# Patient Record
Sex: FEMALE | Race: ASIAN
[De-identification: names, ages, dates, MRNs, and addresses within clinical notes are randomized per-mention and may not be internally consistent; named-entity substitution may affect disease eponyms.]

---

## 2018-01-07 ENCOUNTER — TRANSCRIPTION ENCOUNTER (OUTPATIENT)
Age: 33
End: 2018-01-07

## 2018-01-16 ENCOUNTER — APPOINTMENT (OUTPATIENT)
Dept: INTERNAL MEDICINE | Facility: CLINIC | Age: 33
End: 2018-01-16
Payer: COMMERCIAL

## 2018-01-16 VITALS
HEIGHT: 62 IN | DIASTOLIC BLOOD PRESSURE: 70 MMHG | OXYGEN SATURATION: 98 % | SYSTOLIC BLOOD PRESSURE: 104 MMHG | BODY MASS INDEX: 33.68 KG/M2 | TEMPERATURE: 98.3 F | HEART RATE: 82 BPM | WEIGHT: 183 LBS

## 2018-01-16 DIAGNOSIS — Z84.0 FAMILY HISTORY OF DISEASES OF THE SKIN AND SUBCUTANEOUS TISSUE: ICD-10-CM

## 2018-01-16 DIAGNOSIS — Z78.9 OTHER SPECIFIED HEALTH STATUS: ICD-10-CM

## 2018-01-16 DIAGNOSIS — Z87.440 PERSONAL HISTORY OF URINARY (TRACT) INFECTIONS: ICD-10-CM

## 2018-01-16 DIAGNOSIS — Z83.49 FAMILY HISTORY OF OTHER ENDOCRINE, NUTRITIONAL AND METABOLIC DISEASES: ICD-10-CM

## 2018-01-16 DIAGNOSIS — Z82.0 FAMILY HISTORY OF EPILEPSY AND OTHER DISEASES OF THE NERVOUS SYSTEM: ICD-10-CM

## 2018-01-16 PROCEDURE — 36415 COLL VENOUS BLD VENIPUNCTURE: CPT

## 2018-01-16 PROCEDURE — 99385 PREV VISIT NEW AGE 18-39: CPT | Mod: 25

## 2018-01-16 RX ORDER — ELASTIC BANDAGE 2"X2.2YD
BANDAGE TOPICAL
Refills: 0 | Status: ACTIVE | COMMUNITY

## 2018-01-16 RX ORDER — VITAMIN B COMPLEX
CAPSULE ORAL
Refills: 0 | Status: ACTIVE | COMMUNITY

## 2018-01-17 LAB
25(OH)D3 SERPL-MCNC: 21.8 NG/ML
ALBUMIN SERPL ELPH-MCNC: 4.2 G/DL
ALP BLD-CCNC: 61 U/L
ALT SERPL-CCNC: 21 U/L
ANION GAP SERPL CALC-SCNC: 13 MMOL/L
AST SERPL-CCNC: 27 U/L
BASOPHILS # BLD AUTO: 0.03 K/UL
BASOPHILS NFR BLD AUTO: 0.5 %
BILIRUB SERPL-MCNC: 0.3 MG/DL
BUN SERPL-MCNC: 12 MG/DL
CALCIUM SERPL-MCNC: 9.5 MG/DL
CHLORIDE SERPL-SCNC: 103 MMOL/L
CHOLEST SERPL-MCNC: 177 MG/DL
CHOLEST/HDLC SERPL: 3.3 RATIO
CO2 SERPL-SCNC: 25 MMOL/L
CREAT SERPL-MCNC: 0.77 MG/DL
EOSINOPHIL # BLD AUTO: 0.05 K/UL
EOSINOPHIL NFR BLD AUTO: 0.9 %
GLUCOSE SERPL-MCNC: 99 MG/DL
HBA1C MFR BLD HPLC: 5.5 %
HCT VFR BLD CALC: 44.6 %
HDLC SERPL-MCNC: 53 MG/DL
HGB BLD-MCNC: 14.5 G/DL
HIV1+2 AB SPEC QL IA.RAPID: NONREACTIVE
IMM GRANULOCYTES NFR BLD AUTO: 0.2 %
LDLC SERPL CALC-MCNC: 108 MG/DL
LYMPHOCYTES # BLD AUTO: 1.89 K/UL
LYMPHOCYTES NFR BLD AUTO: 32.4 %
MAN DIFF?: NORMAL
MCHC RBC-ENTMCNC: 30.9 PG
MCHC RBC-ENTMCNC: 32.5 GM/DL
MCV RBC AUTO: 95.1 FL
MONOCYTES # BLD AUTO: 0.34 K/UL
MONOCYTES NFR BLD AUTO: 5.8 %
NEUTROPHILS # BLD AUTO: 3.51 K/UL
NEUTROPHILS NFR BLD AUTO: 60.2 %
PLATELET # BLD AUTO: 251 K/UL
POTASSIUM SERPL-SCNC: 4.4 MMOL/L
PROT SERPL-MCNC: 7.5 G/DL
RBC # BLD: 4.69 M/UL
RBC # FLD: 13.5 %
SODIUM SERPL-SCNC: 141 MMOL/L
TRIGL SERPL-MCNC: 81 MG/DL
TSH SERPL-ACNC: 1.36 UIU/ML
WBC # FLD AUTO: 5.83 K/UL

## 2018-01-18 LAB
ADJUSTED MITOGEN: 9.58 IU/ML
ADJUSTED TB AG: 0 IU/ML
M TB IFN-G BLD-IMP: NEGATIVE
QUANTIFERON GOLD NIL: 0.04 IU/ML

## 2018-02-12 ENCOUNTER — APPOINTMENT (OUTPATIENT)
Dept: OBGYN | Facility: CLINIC | Age: 33
End: 2018-02-12
Payer: COMMERCIAL

## 2018-02-12 ENCOUNTER — RESULT REVIEW (OUTPATIENT)
Age: 33
End: 2018-02-12

## 2018-02-12 PROCEDURE — 99385 PREV VISIT NEW AGE 18-39: CPT

## 2018-03-01 ENCOUNTER — TRANSCRIPTION ENCOUNTER (OUTPATIENT)
Age: 33
End: 2018-03-01

## 2018-07-28 PROBLEM — Z78.9 ALCOHOL USE: Status: ACTIVE | Noted: 2018-01-16

## 2018-08-10 ENCOUNTER — APPOINTMENT (OUTPATIENT)
Dept: FAMILY MEDICINE | Facility: CLINIC | Age: 33
End: 2018-08-10

## 2018-08-13 ENCOUNTER — APPOINTMENT (OUTPATIENT)
Dept: INTERNAL MEDICINE | Facility: CLINIC | Age: 33
End: 2018-08-13

## 2018-08-21 ENCOUNTER — APPOINTMENT (OUTPATIENT)
Dept: INTERNAL MEDICINE | Facility: CLINIC | Age: 33
End: 2018-08-21
Payer: COMMERCIAL

## 2018-08-21 VITALS
SYSTOLIC BLOOD PRESSURE: 114 MMHG | BODY MASS INDEX: 35.51 KG/M2 | DIASTOLIC BLOOD PRESSURE: 84 MMHG | WEIGHT: 193 LBS | HEIGHT: 62 IN | HEART RATE: 90 BPM | OXYGEN SATURATION: 98 %

## 2018-08-21 DIAGNOSIS — H91.90 UNSPECIFIED HEARING LOSS, UNSPECIFIED EAR: ICD-10-CM

## 2018-08-21 PROCEDURE — 99213 OFFICE O/P EST LOW 20 MIN: CPT

## 2018-08-21 RX ORDER — CLOBETASOL PROPIONATE 0.5 MG/G
0.05 CREAM TOPICAL TWICE DAILY
Qty: 15 | Refills: 0 | Status: ACTIVE | COMMUNITY
Start: 2018-08-21 | End: 1900-01-01

## 2018-08-21 NOTE — ASSESSMENT
[FreeTextEntry1] : 33F with psoriasis comes in for follow up\par - psoriasis: clobetasol cream provided\par - concern about bacterial vaginosis: specimen sent\par \par RTC in Jan 2019 for annual check up

## 2018-08-21 NOTE — HISTORY OF PRESENT ILLNESS
[FreeTextEntry1] : follow up [de-identified] : 33F with psoriasis comes in for follow up\par does not exercise\par trying to have a baby

## 2018-08-21 NOTE — REVIEW OF SYSTEMS
[Back Pain] : back pain [Fever] : no fever [Chills] : no chills [Recent Change In Weight] : ~T no recent weight change [Chest Pain] : no chest pain [Palpitations] : no palpitations [Shortness Of Breath] : no shortness of breath [Abdominal Pain] : no abdominal pain [Constipation] : no constipation [Diarrhea] : diarrhea [Dysuria] : no dysuria [Joint Pain] : no joint pain

## 2018-08-21 NOTE — PHYSICAL EXAM
[No Acute Distress] : no acute distress [Well Nourished] : well nourished [Well Developed] : well developed [Well-Appearing] : well-appearing [Normal Sclera/Conjunctiva] : normal sclera/conjunctiva [Normal Outer Ear/Nose] : the outer ears and nose were normal in appearance [No Respiratory Distress] : no respiratory distress  [Clear to Auscultation] : lungs were clear to auscultation bilaterally [No Accessory Muscle Use] : no accessory muscle use [Normal Rate] : normal rate  [Regular Rhythm] : with a regular rhythm [Normal S1, S2] : normal S1 and S2 [No Murmur] : no murmur heard [de-identified] : skin lesion on neck area

## 2018-08-24 DIAGNOSIS — N76.0 ACUTE VAGINITIS: ICD-10-CM

## 2018-08-27 LAB
CANDIDA VAG CYTO: DETECTED
G VAGINALIS+PREV SP MTYP VAG QL MICRO: DETECTED
T VAGINALIS VAG QL WET PREP: NOT DETECTED

## 2018-10-22 ENCOUNTER — APPOINTMENT (OUTPATIENT)
Dept: PULMONOLOGY | Facility: CLINIC | Age: 33
End: 2018-10-22
Payer: COMMERCIAL

## 2018-10-22 PROCEDURE — 94726 PLETHYSMOGRAPHY LUNG VOLUMES: CPT

## 2018-10-22 PROCEDURE — 94729 DIFFUSING CAPACITY: CPT

## 2018-10-22 PROCEDURE — 94060 EVALUATION OF WHEEZING: CPT

## 2018-12-21 ENCOUNTER — APPOINTMENT (OUTPATIENT)
Dept: INTERNAL MEDICINE | Facility: CLINIC | Age: 33
End: 2018-12-21
Payer: COMMERCIAL

## 2018-12-21 ENCOUNTER — NON-APPOINTMENT (OUTPATIENT)
Age: 33
End: 2018-12-21

## 2018-12-21 VITALS
WEIGHT: 188 LBS | SYSTOLIC BLOOD PRESSURE: 108 MMHG | OXYGEN SATURATION: 95 % | HEIGHT: 62 IN | RESPIRATION RATE: 14 BRPM | BODY MASS INDEX: 34.6 KG/M2 | TEMPERATURE: 98.5 F | HEART RATE: 99 BPM | DIASTOLIC BLOOD PRESSURE: 80 MMHG

## 2018-12-21 DIAGNOSIS — J45.909 UNSPECIFIED ASTHMA, UNCOMPLICATED: ICD-10-CM

## 2018-12-21 DIAGNOSIS — Z00.00 ENCOUNTER FOR GENERAL ADULT MEDICAL EXAMINATION W/OUT ABNORMAL FINDINGS: ICD-10-CM

## 2018-12-21 DIAGNOSIS — L72.9 FOLLICULAR CYST OF THE SKIN AND SUBCUTANEOUS TISSUE, UNSPECIFIED: ICD-10-CM

## 2018-12-21 PROCEDURE — 93000 ELECTROCARDIOGRAM COMPLETE: CPT

## 2018-12-21 PROCEDURE — 99395 PREV VISIT EST AGE 18-39: CPT

## 2018-12-21 PROCEDURE — 99385 PREV VISIT NEW AGE 18-39: CPT | Mod: 25

## 2018-12-21 RX ORDER — MUPIROCIN 2 G/100G
2 CREAM TOPICAL TWICE DAILY
Qty: 1 | Refills: 1 | Status: ACTIVE | COMMUNITY
Start: 2018-12-21 | End: 1900-01-01

## 2018-12-21 RX ORDER — FLUCONAZOLE 150 MG/1
150 TABLET ORAL
Qty: 1 | Refills: 0 | Status: DISCONTINUED | COMMUNITY
Start: 2018-08-24 | End: 2018-12-21

## 2018-12-21 RX ORDER — METRONIDAZOLE 500 MG/1
500 TABLET ORAL TWICE DAILY
Qty: 14 | Refills: 0 | Status: DISCONTINUED | COMMUNITY
Start: 2018-08-24 | End: 2018-12-21

## 2018-12-21 RX ORDER — ALBUTEROL SULFATE 90 UG/1
108 (90 BASE) AEROSOL, METERED RESPIRATORY (INHALATION) 4 TIMES DAILY
Qty: 1 | Refills: 3 | Status: ACTIVE | COMMUNITY
Start: 2018-12-21 | End: 1900-01-01

## 2018-12-21 RX ORDER — MOMETASONE FUROATE 1 MG/G
0.1 CREAM TOPICAL TWICE DAILY
Qty: 1 | Refills: 0 | Status: ACTIVE | COMMUNITY
Start: 2018-12-21 | End: 1900-01-01

## 2018-12-21 NOTE — PHYSICAL EXAM

## 2018-12-21 NOTE — HISTORY OF PRESENT ILLNESS
[de-identified] : Here for annual physical, Overall feeling well. Received flu vaccine\par Needs titres and [employer] for work. She is a nurse at [employer].\par Pt c/o small cyst on her chest after popping a pimple

## 2018-12-28 ENCOUNTER — LABORATORY RESULT (OUTPATIENT)
Age: 33
End: 2018-12-28

## 2019-01-06 LAB
25(OH)D3 SERPL-MCNC: 15 NG/ML
ALBUMIN SERPL ELPH-MCNC: 4.1 G/DL
ALP BLD-CCNC: 60 U/L
ALT SERPL-CCNC: 19 U/L
ANION GAP SERPL CALC-SCNC: 9 MMOL/L
APPEARANCE: CLEAR
AST SERPL-CCNC: 19 U/L
BASOPHILS # BLD AUTO: 0.02 K/UL
BASOPHILS NFR BLD AUTO: 0.3 %
BILIRUB SERPL-MCNC: 0.6 MG/DL
BILIRUBIN URINE: NEGATIVE
BLOOD URINE: NEGATIVE
BUN SERPL-MCNC: 11 MG/DL
CALCIUM SERPL-MCNC: 8.8 MG/DL
CHLORIDE SERPL-SCNC: 108 MMOL/L
CHOLEST SERPL-MCNC: 167 MG/DL
CHOLEST/HDLC SERPL: 3.2 RATIO
CO2 SERPL-SCNC: 25 MMOL/L
COLOR: YELLOW
CREAT SERPL-MCNC: 0.68 MG/DL
EOSINOPHIL # BLD AUTO: 0.11 K/UL
EOSINOPHIL NFR BLD AUTO: 1.7 %
ESTIMATED AVERAGE GLUCOSE: 114 MG/DL
FOLATE SERPL-MCNC: 12.2 NG/ML
GLUCOSE QUALITATIVE U: NEGATIVE MG/DL
GLUCOSE SERPL-MCNC: 92 MG/DL
HBA1C MFR BLD HPLC: 5.6 %
HBV SURFACE AB SER QL: ABNORMAL
HBV SURFACE AG SER QL: NONREACTIVE
HCT VFR BLD CALC: 41.6 %
HCV AB SER QL: NONREACTIVE
HCV S/CO RATIO: 0.2 S/CO
HDLC SERPL-MCNC: 53 MG/DL
HGB BLD-MCNC: 12.9 G/DL
IMM GRANULOCYTES NFR BLD AUTO: 0.2 %
KETONES URINE: NEGATIVE
LDLC SERPL CALC-MCNC: 97 MG/DL
LEUKOCYTE ESTERASE URINE: ABNORMAL
LYMPHOCYTES # BLD AUTO: 2.22 K/UL
LYMPHOCYTES NFR BLD AUTO: 34.5 %
M TB IFN-G BLD-IMP: NEGATIVE
MAN DIFF?: NORMAL
MCHC RBC-ENTMCNC: 28.4 PG
MCHC RBC-ENTMCNC: 31 GM/DL
MCV RBC AUTO: 91.4 FL
MEV IGG FLD QL IA: 78.9 AU/ML
MEV IGG+IGM SER-IMP: POSITIVE
MONOCYTES # BLD AUTO: 0.7 K/UL
MONOCYTES NFR BLD AUTO: 10.9 %
MUV AB SER-ACNC: POSITIVE
MUV IGG SER QL IA: 189 AU/ML
NEUTROPHILS # BLD AUTO: 3.38 K/UL
NEUTROPHILS NFR BLD AUTO: 52.4 %
NITRITE URINE: NEGATIVE
PH URINE: 7
PLATELET # BLD AUTO: 268 K/UL
POTASSIUM SERPL-SCNC: 4.5 MMOL/L
PROT SERPL-MCNC: 7.1 G/DL
PROTEIN URINE: NEGATIVE MG/DL
QUANTIFERON TB PLUS MITOGEN MINUS NIL: 7.61 IU/ML
QUANTIFERON TB PLUS NIL: 0.05 IU/ML
QUANTIFERON TB PLUS TB1 MINUS NIL: -0.01 IU/ML
QUANTIFERON TB PLUS TB2 MINUS NIL: 0.02 IU/ML
RBC # BLD: 4.55 M/UL
RBC # FLD: 15.2 %
RUBV IGG FLD-ACNC: 6.3 INDEX
RUBV IGG SER-IMP: POSITIVE
SODIUM SERPL-SCNC: 142 MMOL/L
SPECIFIC GRAVITY URINE: 1.02
TRIGL SERPL-MCNC: 83 MG/DL
TSH SERPL-ACNC: 1.39 UIU/ML
UROBILINOGEN URINE: NEGATIVE MG/DL
VIT B12 SERPL-MCNC: 376 PG/ML
VZV AB TITR SER: POSITIVE
VZV IGG SER IF-ACNC: 1143 INDEX
WBC # FLD AUTO: 6.44 K/UL

## 2019-02-01 ENCOUNTER — APPOINTMENT (OUTPATIENT)
Dept: INTERNAL MEDICINE | Facility: CLINIC | Age: 34
End: 2019-02-01

## 2019-04-29 ENCOUNTER — TRANSCRIPTION ENCOUNTER (OUTPATIENT)
Age: 34
End: 2019-04-29

## 2019-05-17 ENCOUNTER — APPOINTMENT (OUTPATIENT)
Dept: HUMAN REPRODUCTION | Facility: CLINIC | Age: 34
End: 2019-05-17
Payer: COMMERCIAL

## 2019-05-17 PROCEDURE — 76830 TRANSVAGINAL US NON-OB: CPT

## 2019-05-17 PROCEDURE — 99204 OFFICE O/P NEW MOD 45 MIN: CPT

## 2019-05-17 PROCEDURE — 36415 COLL VENOUS BLD VENIPUNCTURE: CPT

## 2019-06-19 ENCOUNTER — TRANSCRIPTION ENCOUNTER (OUTPATIENT)
Age: 34
End: 2019-06-19

## 2019-07-01 ENCOUNTER — APPOINTMENT (OUTPATIENT)
Dept: HUMAN REPRODUCTION | Facility: CLINIC | Age: 34
End: 2019-07-01

## 2019-10-28 ENCOUNTER — APPOINTMENT (OUTPATIENT)
Dept: RHEUMATOLOGY | Facility: CLINIC | Age: 34
End: 2019-10-28
Payer: COMMERCIAL

## 2019-10-28 VITALS
DIASTOLIC BLOOD PRESSURE: 70 MMHG | BODY MASS INDEX: 29.44 KG/M2 | HEART RATE: 65 BPM | HEIGHT: 62 IN | TEMPERATURE: 98 F | SYSTOLIC BLOOD PRESSURE: 112 MMHG | WEIGHT: 160 LBS | OXYGEN SATURATION: 98 %

## 2019-10-28 DIAGNOSIS — L40.9 PSORIASIS, UNSPECIFIED: ICD-10-CM

## 2019-10-28 DIAGNOSIS — Z82.69 FAMILY HISTORY OF OTHER DISEASES OF THE MUSCULOSKELETAL SYSTEM AND CONNECTIVE TISSUE: ICD-10-CM

## 2019-10-28 PROCEDURE — 99243 OFF/OP CNSLTJ NEW/EST LOW 30: CPT

## 2019-10-29 LAB
25(OH)D3 SERPL-MCNC: 20.6 NG/ML
ALBUMIN SERPL ELPH-MCNC: 4.1 G/DL
ALP BLD-CCNC: 59 U/L
ALT SERPL-CCNC: 9 U/L
ANION GAP SERPL CALC-SCNC: 11 MMOL/L
APPEARANCE: CLEAR
AST SERPL-CCNC: 19 U/L
BASOPHILS # BLD AUTO: 0.02 K/UL
BASOPHILS NFR BLD AUTO: 0.4 %
BILIRUB SERPL-MCNC: 0.3 MG/DL
BILIRUBIN URINE: NEGATIVE
BLOOD URINE: NEGATIVE
BUN SERPL-MCNC: 13 MG/DL
C3 SERPL-MCNC: 117 MG/DL
C4 SERPL-MCNC: 26 MG/DL
CALCIUM SERPL-MCNC: 9.1 MG/DL
CHLORIDE SERPL-SCNC: 104 MMOL/L
CO2 SERPL-SCNC: 26 MMOL/L
COLOR: COLORLESS
CREAT SERPL-MCNC: 0.72 MG/DL
CREAT SPEC-SCNC: 36 MG/DL
CREAT/PROT UR: NORMAL RATIO
CRP SERPL-MCNC: 0.25 MG/DL
DSDNA AB SER-ACNC: <12 IU/ML
EOSINOPHIL # BLD AUTO: 0.11 K/UL
EOSINOPHIL NFR BLD AUTO: 2 %
ERYTHROCYTE [SEDIMENTATION RATE] IN BLOOD BY WESTERGREN METHOD: 27 MM/HR
GLUCOSE QUALITATIVE U: NEGATIVE
GLUCOSE SERPL-MCNC: 65 MG/DL
HCT VFR BLD CALC: 46.2 %
HGB BLD-MCNC: 14.6 G/DL
IMM GRANULOCYTES NFR BLD AUTO: 0.4 %
KETONES URINE: NEGATIVE
LEUKOCYTE ESTERASE URINE: NEGATIVE
LYMPHOCYTES # BLD AUTO: 1.98 K/UL
LYMPHOCYTES NFR BLD AUTO: 35.5 %
MAN DIFF?: NORMAL
MCHC RBC-ENTMCNC: 31.3 PG
MCHC RBC-ENTMCNC: 31.6 GM/DL
MCV RBC AUTO: 99.1 FL
MONOCYTES # BLD AUTO: 0.38 K/UL
MONOCYTES NFR BLD AUTO: 6.8 %
NEUTROPHILS # BLD AUTO: 3.07 K/UL
NEUTROPHILS NFR BLD AUTO: 54.9 %
NITRITE URINE: NEGATIVE
PH URINE: 7
PLATELET # BLD AUTO: 249 K/UL
POTASSIUM SERPL-SCNC: 4.5 MMOL/L
PROT SERPL-MCNC: 6.9 G/DL
PROT UR-MCNC: <4 MG/DL
PROTEIN URINE: NEGATIVE
RBC # BLD: 4.66 M/UL
RBC # FLD: 13.7 %
SODIUM SERPL-SCNC: 141 MMOL/L
SPECIFIC GRAVITY URINE: 1.01
TSH SERPL-ACNC: 0.95 UIU/ML
UROBILINOGEN URINE: NORMAL
WBC # FLD AUTO: 5.58 K/UL

## 2019-10-31 LAB
ANA SER IF-ACNC: NEGATIVE
CENTROMERE IGG SER-ACNC: <0.2 CD:130001892
ENA SCL70 IGG SER IA-ACNC: <0.2 AL
ENA SS-A AB SER IA-ACNC: <0.2 AL
ENA SS-B AB SER IA-ACNC: <0.2 AL

## 2020-12-16 PROBLEM — N76.0 VAGINOSIS: Status: RESOLVED | Noted: 2018-08-24 | Resolved: 2020-12-16
